# Patient Record
Sex: FEMALE | Race: WHITE | Employment: OTHER | URBAN - METROPOLITAN AREA
[De-identification: names, ages, dates, MRNs, and addresses within clinical notes are randomized per-mention and may not be internally consistent; named-entity substitution may affect disease eponyms.]

---

## 2017-10-23 ENCOUNTER — HOSPITAL ENCOUNTER (OUTPATIENT)
Dept: MAMMOGRAPHY | Age: 62
Discharge: HOME | End: 2017-10-23
Attending: OBSTETRICS & GYNECOLOGY | Admitting: OBSTETRICS & GYNECOLOGY

## 2017-10-23 DIAGNOSIS — Z12.31 VISIT FOR SCREENING MAMMOGRAM: ICD-10-CM

## 2018-10-24 ENCOUNTER — HOSPITAL ENCOUNTER (OUTPATIENT)
Dept: WOMENS IMAGING | Age: 63
Discharge: HOME | End: 2018-10-24
Payer: COMMERCIAL

## 2018-10-24 DIAGNOSIS — Z12.39 BREAST CANCER SCREENING: ICD-10-CM

## 2018-10-24 PROCEDURE — 77063 BREAST TOMOSYNTHESIS BI: CPT

## 2019-04-22 LAB
HPV COMMENT: NORMAL
HPV TYPE 16: NOT DETECTED
HPV TYPE 18: NOT DETECTED
HPVOH (OTHER TYPES): NOT DETECTED

## 2019-07-15 ENCOUNTER — HOSPITAL ENCOUNTER (OUTPATIENT)
Dept: GENERAL RADIOLOGY | Age: 64
Discharge: HOME | End: 2019-07-15
Payer: MEDICARE

## 2019-07-15 DIAGNOSIS — Z78.0 MENOPAUSE: ICD-10-CM

## 2019-07-15 PROCEDURE — 77080 DXA BONE DENSITY AXIAL: CPT

## 2019-11-06 ENCOUNTER — HOSPITAL ENCOUNTER (OUTPATIENT)
Dept: WOMENS IMAGING | Age: 64
Discharge: HOME | End: 2019-11-06
Payer: MEDICARE

## 2019-11-06 DIAGNOSIS — Z12.31 BREAST CANCER SCREENING BY MAMMOGRAM: ICD-10-CM

## 2019-11-06 PROCEDURE — 77067 SCR MAMMO BI INCL CAD: CPT

## 2021-10-04 ENCOUNTER — HOSPITAL ENCOUNTER (OUTPATIENT)
Dept: WOMENS IMAGING | Age: 66
Discharge: HOME | End: 2021-10-04
Payer: MEDICARE

## 2021-10-04 DIAGNOSIS — Z12.31 BREAST CANCER SCREENING BY MAMMOGRAM: ICD-10-CM

## 2021-10-04 PROCEDURE — 77063 BREAST TOMOSYNTHESIS BI: CPT

## 2022-12-12 ENCOUNTER — HOSPITAL ENCOUNTER (OUTPATIENT)
Dept: WOMENS IMAGING | Age: 67
Discharge: HOME | End: 2022-12-12
Payer: MEDICARE

## 2022-12-12 VITALS — WEIGHT: 135 LBS | BODY MASS INDEX: 23.05 KG/M2 | HEIGHT: 64 IN

## 2022-12-12 DIAGNOSIS — Z12.31 BREAST CANCER SCREENING BY MAMMOGRAM: ICD-10-CM

## 2022-12-12 PROCEDURE — 77067 SCR MAMMO BI INCL CAD: CPT

## 2024-01-23 ENCOUNTER — OFFICE VISIT (OUTPATIENT)
Dept: FAMILY MEDICINE CLINIC | Age: 69
End: 2024-01-23
Payer: MEDICARE

## 2024-01-23 VITALS — BODY MASS INDEX: 21.92 KG/M2 | HEIGHT: 64 IN | WEIGHT: 128.4 LBS

## 2024-01-23 DIAGNOSIS — Z91.81 AT HIGH RISK FOR FALLS: ICD-10-CM

## 2024-01-23 DIAGNOSIS — I10 HYPERTENSION, UNSPECIFIED TYPE: ICD-10-CM

## 2024-01-23 DIAGNOSIS — E53.8 VITAMIN B12 DEFICIENCY: ICD-10-CM

## 2024-01-23 DIAGNOSIS — R73.9 BLOOD GLUCOSE ELEVATED: ICD-10-CM

## 2024-01-23 DIAGNOSIS — F03.918 DEMENTIA WITH AGGRESSIVE BEHAVIOR (HCC): Primary | ICD-10-CM

## 2024-01-23 DIAGNOSIS — E78.5 HYPERLIPIDEMIA, UNSPECIFIED HYPERLIPIDEMIA TYPE: ICD-10-CM

## 2024-01-23 LAB
ALBUMIN SERPL-MCNC: 4.3 G/DL (ref 3.4–5)
ALBUMIN/GLOB SERPL: 2.3 {RATIO} (ref 1.1–2.2)
ALP SERPL-CCNC: 105 U/L (ref 40–129)
ALT SERPL-CCNC: 18 U/L (ref 10–40)
ANION GAP SERPL CALCULATED.3IONS-SCNC: 17 MMOL/L (ref 3–16)
AST SERPL-CCNC: 24 U/L (ref 15–37)
BILIRUB SERPL-MCNC: 0.9 MG/DL (ref 0–1)
BUN SERPL-MCNC: 10 MG/DL (ref 7–20)
CALCIUM SERPL-MCNC: 8.9 MG/DL (ref 8.3–10.6)
CHLORIDE SERPL-SCNC: 106 MMOL/L (ref 99–110)
CO2 SERPL-SCNC: 25 MMOL/L (ref 21–32)
CREAT SERPL-MCNC: 0.8 MG/DL (ref 0.6–1.2)
DEPRECATED RDW RBC AUTO: 14.3 % (ref 12.4–15.4)
EST. AVERAGE GLUCOSE BLD GHB EST-MCNC: 157.1 MG/DL
GFR SERPLBLD CREATININE-BSD FMLA CKD-EPI: >60 ML/MIN/{1.73_M2}
GLUCOSE SERPL-MCNC: 173 MG/DL (ref 70–99)
HBA1C MFR BLD: 7.1 %
HCT VFR BLD AUTO: 34.3 % (ref 36–48)
HGB BLD-MCNC: 11.9 G/DL (ref 12–16)
MCH RBC QN AUTO: 32.1 PG (ref 26–34)
MCHC RBC AUTO-ENTMCNC: 34.6 G/DL (ref 31–36)
MCV RBC AUTO: 92.7 FL (ref 80–100)
PLATELET # BLD AUTO: 345 K/UL (ref 135–450)
PMV BLD AUTO: 9.1 FL (ref 5–10.5)
POTASSIUM SERPL-SCNC: 4.5 MMOL/L (ref 3.5–5.1)
PROT SERPL-MCNC: 6.2 G/DL (ref 6.4–8.2)
RBC # BLD AUTO: 3.7 M/UL (ref 4–5.2)
SODIUM SERPL-SCNC: 148 MMOL/L (ref 136–145)
TSH SERPL DL<=0.005 MIU/L-ACNC: 1.44 UIU/ML (ref 0.27–4.2)
VIT B12 SERPL-MCNC: 444 PG/ML (ref 211–911)
WBC # BLD AUTO: 5.7 K/UL (ref 4–11)

## 2024-01-23 PROCEDURE — G8420 CALC BMI NORM PARAMETERS: HCPCS | Performed by: FAMILY MEDICINE

## 2024-01-23 PROCEDURE — G8399 PT W/DXA RESULTS DOCUMENT: HCPCS | Performed by: FAMILY MEDICINE

## 2024-01-23 PROCEDURE — 99203 OFFICE O/P NEW LOW 30 MIN: CPT | Performed by: FAMILY MEDICINE

## 2024-01-23 PROCEDURE — 3017F COLORECTAL CA SCREEN DOC REV: CPT | Performed by: FAMILY MEDICINE

## 2024-01-23 PROCEDURE — G8484 FLU IMMUNIZE NO ADMIN: HCPCS | Performed by: FAMILY MEDICINE

## 2024-01-23 PROCEDURE — 36415 COLL VENOUS BLD VENIPUNCTURE: CPT | Performed by: FAMILY MEDICINE

## 2024-01-23 PROCEDURE — G8427 DOCREV CUR MEDS BY ELIG CLIN: HCPCS | Performed by: FAMILY MEDICINE

## 2024-01-23 PROCEDURE — 4004F PT TOBACCO SCREEN RCVD TLK: CPT | Performed by: FAMILY MEDICINE

## 2024-01-23 PROCEDURE — 1090F PRES/ABSN URINE INCON ASSESS: CPT | Performed by: FAMILY MEDICINE

## 2024-01-23 PROCEDURE — 1123F ACP DISCUSS/DSCN MKR DOCD: CPT | Performed by: FAMILY MEDICINE

## 2024-01-23 RX ORDER — LORAZEPAM 0.5 MG/1
0.5 TABLET ORAL EVERY 8 HOURS PRN
Qty: 60 TABLET | Refills: 1 | Status: SHIPPED | OUTPATIENT
Start: 2024-01-23 | End: 2024-02-22

## 2024-01-23 RX ORDER — QUETIAPINE FUMARATE 25 MG/1
12.5 TABLET, FILM COATED ORAL NIGHTLY
COMMUNITY
Start: 2024-01-16

## 2024-01-23 SDOH — ECONOMIC STABILITY: FOOD INSECURITY
WITHIN THE PAST 12 MONTHS, YOU WORRIED THAT YOUR FOOD WOULD RUN OUT BEFORE YOU GOT MONEY TO BUY MORE.: PATIENT UNABLE TO ANSWER

## 2024-01-23 SDOH — ECONOMIC STABILITY: INCOME INSECURITY
HOW HARD IS IT FOR YOU TO PAY FOR THE VERY BASICS LIKE FOOD, HOUSING, MEDICAL CARE, AND HEATING?: PATIENT UNABLE TO ANSWER

## 2024-01-23 SDOH — ECONOMIC STABILITY: FOOD INSECURITY
WITHIN THE PAST 12 MONTHS, THE FOOD YOU BOUGHT JUST DIDN'T LAST AND YOU DIDN'T HAVE MONEY TO GET MORE.: PATIENT UNABLE TO ANSWER

## 2024-01-23 SDOH — ECONOMIC STABILITY: HOUSING INSECURITY
IN THE LAST 12 MONTHS, WAS THERE A TIME WHEN YOU DID NOT HAVE A STEADY PLACE TO SLEEP OR SLEPT IN A SHELTER (INCLUDING NOW)?: PATIENT UNABLE TO ANSWER

## 2024-01-23 ASSESSMENT — PATIENT HEALTH QUESTIONNAIRE - PHQ9: DEPRESSION UNABLE TO ASSESS: FUNCTIONAL CAPACITY MOTIVATION LIMITS ACCURACY

## 2024-01-23 ASSESSMENT — ENCOUNTER SYMPTOMS
RESPIRATORY NEGATIVE: 1
GASTROINTESTINAL NEGATIVE: 1

## 2024-02-01 ENCOUNTER — TELEPHONE (OUTPATIENT)
Dept: FAMILY MEDICINE CLINIC | Age: 69
End: 2024-02-01

## 2024-02-01 NOTE — TELEPHONE ENCOUNTER
Trinidad with [facility] called to inform Dr. Granados that their NP Barbara saw pt yesterday, 1/31, and recommends stopping the rexulti medication, and restarting the seroquel medication. Trinidad stated the visit notes are available in Care Everywhere under [facility] for the specifics on why NP Barbara recommends switching, and the dosages. If any questions, call [phone], Option 2 for the nurses station

## 2024-02-09 ENCOUNTER — TELEPHONE (OUTPATIENT)
Dept: FAMILY MEDICINE CLINIC | Age: 69
End: 2024-02-09

## 2024-02-09 NOTE — TELEPHONE ENCOUNTER
Patient's daughter called the office also. Spoke with KASSANDRA Castro regarding paperwork. KASSANDRA Castro stated it was ok for the patient's daughter to drop off form to be signed by Dr Granados and she will call the patient's daughter when the form is ready to be picked up. Patient's daughter VU

## 2024-02-09 NOTE — TELEPHONE ENCOUNTER
----- Message from Keenan Power sent at 2/8/2024  4:14 PM EST -----  Subject: Message to Provider    QUESTIONS  Information for Provider? pt daughter called and states pt is going to   nursing home and needs provider to sign off on paper for financials for   nursing home and stating that pt was seen for medical issues pt daughter   curious about how to go about it and if she can just come in with the form   since she has it   ---------------------------------------------------------------------------  --------------  CALL BACK INFO  [phone]; OK to leave message on voicemail  ---------------------------------------------------------------------------  --------------  SCRIPT ANSWERS  Relationship to Patient? Other/Third Party  Representative Name? daughter  Is the representative on the Communication Release of Information ([place])   form in Epic? Yes

## 2024-02-12 ENCOUNTER — TELEPHONE (OUTPATIENT)
Dept: FAMILY MEDICINE CLINIC | Age: 69
End: 2024-02-12

## 2024-02-12 NOTE — TELEPHONE ENCOUNTER
Pts daughter dropped off [employer] form to be filled out by Darwin.  Scanned in media and gave original to Gloria.

## 2024-02-13 NOTE — TELEPHONE ENCOUNTER
Patient's daughter called stating the nursing home has a room available for the patient but is needing the paperwork filled out and signed before admitting patient. Requesting paperwork ASAP. Please call patient's daughter Brooke when ready

## 2024-02-13 NOTE — TELEPHONE ENCOUNTER
Spoke with Brooke and informed her form is completed. Brooke states her dad will come by to pick it up.  Form at .

## 2024-02-17 ENCOUNTER — APPOINTMENT (OUTPATIENT)
Dept: GENERAL RADIOLOGY | Age: 69
End: 2024-02-17
Payer: MEDICARE

## 2024-02-17 ENCOUNTER — APPOINTMENT (OUTPATIENT)
Dept: CT IMAGING | Age: 69
End: 2024-02-17
Payer: MEDICARE

## 2024-02-17 ENCOUNTER — HOSPITAL ENCOUNTER (EMERGENCY)
Age: 69
Discharge: HOME | End: 2024-02-17
Attending: INTERNAL MEDICINE
Payer: MEDICARE

## 2024-02-17 VITALS
RESPIRATION RATE: 18 BRPM | DIASTOLIC BLOOD PRESSURE: 74 MMHG | TEMPERATURE: 98.1 F | OXYGEN SATURATION: 96 % | HEART RATE: 89 BPM | SYSTOLIC BLOOD PRESSURE: 151 MMHG

## 2024-02-17 DIAGNOSIS — S09.90XA CLOSED HEAD INJURY, INITIAL ENCOUNTER: ICD-10-CM

## 2024-02-17 DIAGNOSIS — S01.01XA LACERATION OF SCALP, INITIAL ENCOUNTER: Primary | ICD-10-CM

## 2024-02-17 DIAGNOSIS — Z86.59 HISTORY OF DEMENTIA: ICD-10-CM
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
## 2024-02-17 PROCEDURE — 70450 CT HEAD/BRAIN W/O DYE: CPT

## 2024-02-17 PROCEDURE — 72170 X-RAY EXAM OF PELVIS: CPT

## 2024-02-17 PROCEDURE — 90471 IMMUNIZATION ADMIN: CPT | Performed by: INTERNAL MEDICINE

## 2024-02-17 PROCEDURE — 96372 THER/PROPH/DIAG INJ SC/IM: CPT

## 2024-02-17 PROCEDURE — 72125 CT NECK SPINE W/O DYE: CPT

## 2024-02-17 PROCEDURE — 6360000002 HC RX W HCPCS: Performed by: INTERNAL MEDICINE

## 2024-02-17 PROCEDURE — 99284 EMERGENCY DEPT VISIT MOD MDM: CPT

## 2024-02-17 PROCEDURE — 90714 TD VACC NO PRESV 7 YRS+ IM: CPT | Performed by: INTERNAL MEDICINE

## 2024-02-17 PROCEDURE — 96374 THER/PROPH/DIAG INJ IV PUSH: CPT

## 2024-02-17 PROCEDURE — 12015 RPR F/E/E/N/L/M 7.6-12.5 CM: CPT

## 2024-02-17 RX ORDER — DIAZEPAM 5 MG/ML
5 INJECTION, SOLUTION INTRAMUSCULAR; INTRAVENOUS ONCE
Status: COMPLETED | OUTPATIENT
Start: 2024-02-17 | End: 2024-02-17

## 2024-02-17 RX ORDER — BACITRACIN ZINC AND POLYMYXIN B SULFATE 500; 1000 [USP'U]/G; [USP'U]/G
OINTMENT TOPICAL
Status: DISCONTINUED
Start: 2024-02-17 | End: 2024-02-17 | Stop reason: HOSPADM

## 2024-02-17 RX ADMIN — CLOSTRIDIUM TETANI TOXOID ANTIGEN (FORMALDEHYDE INACTIVATED) AND CORYNEBACTERIUM DIPHTHERIAE TOXOID ANTIGEN (FORMALDEHYDE INACTIVATED) 0.5 ML: 5; 2 INJECTION, SUSPENSION INTRAMUSCULAR at 07:40

## 2024-02-17 RX ADMIN — DIAZEPAM 5 MG: 5 INJECTION, SOLUTION INTRAMUSCULAR; INTRAVENOUS at 09:52

## 2024-02-17 RX ADMIN — DIAZEPAM 5 MG: 5 INJECTION, SOLUTION INTRAMUSCULAR; INTRAVENOUS at 07:40

## 2024-02-17 NOTE — ED NOTES
Pt becoming more agitated, unable to lie still for CT scan.   Valium given as charted, see MAR.  Pt transported to CT, resting with eyes closed, respirations even and unlabored VSS

## 2024-02-17 NOTE — ED PROVIDER NOTES
EMERGENCY MEDICINE PROVIDER NOTE    Patient Identification  Pt Name: Dai Zarate  MRN: [id number]  Birthdate 1955  Date of evaluation: 2/17/2024  Provider: WIL REYNOSO DO  PCP: Ajit Granados DO    Chief Complaint  Fall (PT fell and hit head on night stand per EMS. Lac to right side of forehead. HX of dementia)      HPI  (History provided by family member patient and daughter)  This is a 68 y.o. female who was brought in by EMS transportation for laceration to the right temple after falling out of bed and hitting her head on the nightstand.  Patient has severe dementia with agitation according to her daughter who is at bedside.  Patient does not give me any review of systems.  Her daughter states she is at her baseline with respect to her mental status.  Her daughter also states that she becomes quite agitated.  Daughter states she also at times gets up and wanders around the house.    I have reviewed the following nursing documentation:  Allergies: Patient has no known allergies.    Past medical history: No past medical history on file.  Past surgical history: No past surgical history on file.    Home medications:   Discharge Medication List as of 2/17/2024 11:33 AM        CONTINUE these medications which have NOT CHANGED    Details   QUEtiapine (SEROQUEL) 25 MG tablet Take 0.5 tablets by mouth nightlyHistorical Med      brexpiprazole (REXULTI) 0.25 MG TABS tablet Take 1 tablet by mouth daily, Disp-30 tablet, R-1Normal      LORazepam (ATIVAN) 0.5 MG tablet Take 1 tablet by mouth every 8 hours as needed for Anxiety for up to 30 days. Max Daily Amount: 1.5 mg, Disp-60 tablet, R-1Normal             Social history:      Family history:  No family history on file.    Exam  ED Triage Vitals [02/17/24 0613]   BP Temp Temp Source Pulse Respirations SpO2 Height Weight   129/68 98.1 °F (36.7 °C) Oral 89 18 100 % -- --     Nursing note and vitals reviewed.  General: Patient does not appear to be in acute  Medications:  Discharge Medication List as of 2/17/2024 11:33 AM          This chart was generated using the Dragon dictation system. I created this record but it may contain dictation errors given the limitations of this technology.        Goyo Lockhart DO  02/17/24 1514

## 2024-03-02 ENCOUNTER — HOSPITAL ENCOUNTER (EMERGENCY)
Age: 69
Discharge: HOME | End: 2024-03-02
Payer: MEDICARE

## 2024-03-02 VITALS
OXYGEN SATURATION: 100 % | BODY MASS INDEX: 21.85 KG/M2 | HEART RATE: 92 BPM | WEIGHT: 128 LBS | SYSTOLIC BLOOD PRESSURE: 147 MMHG | HEIGHT: 64 IN | DIASTOLIC BLOOD PRESSURE: 82 MMHG | RESPIRATION RATE: 14 BRPM | TEMPERATURE: 96.9 F

## 2024-03-02 DIAGNOSIS — Z51.89 ENCOUNTER FOR WOUND CARE: ICD-10-CM

## 2024-03-02 DIAGNOSIS — Z48.02 ENCOUNTER FOR REMOVAL OF SUTURES: Primary | ICD-10-CM

## 2024-03-02 PROCEDURE — 99282 EMERGENCY DEPT VISIT SF MDM: CPT

## 2024-03-02 ASSESSMENT — ENCOUNTER SYMPTOMS
BACK PAIN: 0
COLOR CHANGE: 0

## 2024-03-02 ASSESSMENT — PAIN - FUNCTIONAL ASSESSMENT: PAIN_FUNCTIONAL_ASSESSMENT: NONE - DENIES PAIN

## 2024-03-02 ASSESSMENT — LIFESTYLE VARIABLES
HOW OFTEN DO YOU HAVE A DRINK CONTAINING ALCOHOL: NEVER
HOW MANY STANDARD DRINKS CONTAINING ALCOHOL DO YOU HAVE ON A TYPICAL DAY: PATIENT DOES NOT DRINK

## 2024-03-02 NOTE — ED PROVIDER NOTES
[facility] EMERGENCY DEPARTMENT  EMERGENCY DEPARTMENT ENCOUNTER        Pt Name: Dai Zarate  MRN: [id number]  Birthdate 1955  Date of evaluation: 3/2/2024  Provider: Pia Varela PA-C  PCP: Ajit Granados DO  Note Started: 2:59 PM EST 3/2/24      EBENEZER. I have evaluated this patient.        CHIEF COMPLAINT       Chief Complaint   Patient presents with    Suture / Staple Removal     Pt arrived needing stiches removed. Pt caretaker states that they attempted to go to several different places who refused to remove them.        HISTORY OF PRESENT ILLNESS: 1 or more Elements     History from : Patient and daughter    Limitations to history : dementia    Dai Zarate is a 68 y.o. female who presents to the emergency department for suture removal.  She is with her daughter at bedside.  On 2/17/2024, patient presented to the emergency department with laceration of scalp.  3 sutures were placed by the ED attending. Her CT scans were stable.  Patient has not had any issues with the sutures, bleeding, drainage or complaints of pain.  She is sitting comfortably in bed and does not appear to be in any acute distress.    Nursing Notes were all reviewed and agreed with or any disagreements were addressed in the HPI.    REVIEW OF SYSTEMS :      Review of Systems   Constitutional:  Negative for chills and fever.   Eyes:  Negative for visual disturbance.   Musculoskeletal:  Negative for back pain, myalgias and neck pain.   Skin:  Positive for wound. Negative for color change, pallor and rash.   Neurological:  Negative for headaches.   Psychiatric/Behavioral:  Confusion: baseline.    All other systems reviewed and are negative.      Positives and Pertinent negatives as per HPI.     SURGICAL HISTORY   No past surgical history on file.    CURRENTMEDICATIONS       Previous Medications    BREXPIPRAZOLE (REXULTI) 0.25 MG TABS TABLET    Take 1 tablet by mouth daily    QUETIAPINE (SEROQUEL) 25 MG  TABLET    Take 0.5 tablets by mouth nightly       ALLERGIES     Latex and Sulfa antibiotics    FAMILYHISTORY     No family history on file.     SOCIAL HISTORY          SCREENINGS        [place] Coma Scale  Eye Opening: Spontaneous  Best Verbal Response: Oriented  Best Motor Response: Obeys commands  [place] Coma Scale Score: 15                CIWA Assessment  BP: (!) 147/82  Pulse: 92           PHYSICAL EXAM  1 or more Elements     ED Triage Vitals [03/02/24 1455]   BP Temp Temp src Pulse Respirations SpO2 Height Weight - Scale   (!) 147/82 96.9 °F (36.1 °C) -- 92 14 100 % 1.626 m (5' 4\") 58.1 kg (128 lb)       Physical Exam  Vitals and nursing note reviewed.   Constitutional:       Appearance: Normal appearance. She is well-developed. She is not toxic-appearing or diaphoretic.   HENT:      Head: Normocephalic.        Right Ear: External ear normal.      Left Ear: External ear normal.      Nose: Nose normal.      Mouth/Throat:      Mouth: Mucous membranes are moist.      Pharynx: Oropharynx is clear.   Eyes:      General:         Right eye: No discharge.         Left eye: No discharge.   Cardiovascular:      Rate and Rhythm: Normal rate.   Pulmonary:      Effort: Pulmonary effort is normal.      Breath sounds: Normal breath sounds.   Abdominal:      General: Bowel sounds are normal.      Palpations: Abdomen is soft.      Tenderness: There is no abdominal tenderness.   Musculoskeletal:         General: Normal range of motion.      Cervical back: Normal range of motion.   Skin:     General: Skin is warm and dry.      Capillary Refill: Capillary refill takes less than 2 seconds.      Coloration: Skin is not jaundiced or pale.      Findings: No bruising, erythema, lesion or rash.   Neurological:      General: No focal deficit present.      Mental Status: She is alert.      Comments: Oriented to person only. This is baseline per daughter  Moving all four extremities without weakness     Psychiatric:         Behavior: